# Patient Record
Sex: MALE | Race: WHITE | NOT HISPANIC OR LATINO
[De-identification: names, ages, dates, MRNs, and addresses within clinical notes are randomized per-mention and may not be internally consistent; named-entity substitution may affect disease eponyms.]

---

## 2017-05-19 PROBLEM — Z00.00 ENCOUNTER FOR PREVENTIVE HEALTH EXAMINATION: Status: ACTIVE | Noted: 2017-05-19

## 2017-05-22 ENCOUNTER — APPOINTMENT (OUTPATIENT)
Dept: HEART AND VASCULAR | Facility: CLINIC | Age: 67
End: 2017-05-22

## 2017-05-22 ENCOUNTER — NON-APPOINTMENT (OUTPATIENT)
Age: 67
End: 2017-05-22

## 2017-05-22 VITALS — DIASTOLIC BLOOD PRESSURE: 78 MMHG | HEART RATE: 61 BPM | SYSTOLIC BLOOD PRESSURE: 120 MMHG | OXYGEN SATURATION: 98 %

## 2017-05-22 VITALS — WEIGHT: 190 LBS | HEIGHT: 72 IN | BODY MASS INDEX: 25.73 KG/M2

## 2017-05-22 DIAGNOSIS — R06.09 OTHER FORMS OF DYSPNEA: ICD-10-CM

## 2017-05-22 DIAGNOSIS — Z87.891 PERSONAL HISTORY OF NICOTINE DEPENDENCE: ICD-10-CM

## 2017-05-22 DIAGNOSIS — R07.89 OTHER CHEST PAIN: ICD-10-CM

## 2017-05-22 DIAGNOSIS — R94.31 ABNORMAL ELECTROCARDIOGRAM [ECG] [EKG]: ICD-10-CM

## 2017-05-23 ENCOUNTER — APPOINTMENT (OUTPATIENT)
Dept: HEART AND VASCULAR | Facility: CLINIC | Age: 67
End: 2017-05-23

## 2017-05-24 LAB
25(OH)D3 SERPL-MCNC: 26.4 NG/ML
ALBUMIN SERPL ELPH-MCNC: 4.4 G/DL
ALP BLD-CCNC: 62 U/L
ALT SERPL-CCNC: 28 U/L
ANION GAP SERPL CALC-SCNC: 16 MMOL/L
AST SERPL-CCNC: 32 U/L
BASOPHILS # BLD AUTO: 0.03 K/UL
BASOPHILS NFR BLD AUTO: 0.7 %
BILIRUB SERPL-MCNC: 0.6 MG/DL
BUN SERPL-MCNC: 15 MG/DL
CALCIUM SERPL-MCNC: 9.4 MG/DL
CHLORIDE SERPL-SCNC: 103 MMOL/L
CHOLEST SERPL-MCNC: 218 MG/DL
CHOLEST/HDLC SERPL: 4.2 RATIO
CO2 SERPL-SCNC: 23 MMOL/L
CREAT SERPL-MCNC: 1.02 MG/DL
EOSINOPHIL # BLD AUTO: 0.06 K/UL
EOSINOPHIL NFR BLD AUTO: 1.5 %
GLUCOSE SERPL-MCNC: 117 MG/DL
HBA1C MFR BLD HPLC: 6 %
HCT VFR BLD CALC: 47.6 %
HDLC SERPL-MCNC: 52 MG/DL
HGB BLD-MCNC: 15.2 G/DL
IMM GRANULOCYTES NFR BLD AUTO: 0 %
LDLC SERPL CALC-MCNC: 146 MG/DL
LYMPHOCYTES # BLD AUTO: 1.61 K/UL
LYMPHOCYTES NFR BLD AUTO: 39.6 %
MAN DIFF?: NORMAL
MCHC RBC-ENTMCNC: 28.6 PG
MCHC RBC-ENTMCNC: 31.9 GM/DL
MCV RBC AUTO: 89.5 FL
MONOCYTES # BLD AUTO: 0.31 K/UL
MONOCYTES NFR BLD AUTO: 7.6 %
NEUTROPHILS # BLD AUTO: 2.06 K/UL
NEUTROPHILS NFR BLD AUTO: 50.6 %
PLATELET # BLD AUTO: 194 K/UL
POTASSIUM SERPL-SCNC: 4.7 MMOL/L
PROT SERPL-MCNC: 6.7 G/DL
RBC # BLD: 5.32 M/UL
RBC # FLD: 13.8 %
SODIUM SERPL-SCNC: 142 MMOL/L
TRIGL SERPL-MCNC: 99 MG/DL
TSH SERPL-ACNC: 1.7 UIU/ML
VIT B12 SERPL-MCNC: 379 PG/ML
WBC # FLD AUTO: 4.07 K/UL

## 2018-06-02 ENCOUNTER — EMERGENCY (EMERGENCY)
Facility: HOSPITAL | Age: 68
LOS: 1 days | Discharge: HOME | End: 2018-06-02
Attending: EMERGENCY MEDICINE
Payer: SELF-PAY

## 2018-06-02 VITALS — WEIGHT: 191.8 LBS | HEIGHT: 72 IN

## 2018-06-02 VITALS
DIASTOLIC BLOOD PRESSURE: 78 MMHG | OXYGEN SATURATION: 99 % | HEART RATE: 68 BPM | RESPIRATION RATE: 18 BRPM | SYSTOLIC BLOOD PRESSURE: 132 MMHG

## 2018-06-02 PROCEDURE — 99284 EMERGENCY DEPT VISIT MOD MDM: CPT

## 2018-06-02 PROCEDURE — 93971 EXTREMITY STUDY: CPT

## 2018-06-02 PROCEDURE — 93971 EXTREMITY STUDY: CPT | Mod: 26,RT

## 2018-06-02 NOTE — ED STATDOCS - MEDICAL DECISION MAKING DETAILS
Patient presents for evaluation of RLE edema and calf pain to rule out DVT with doppler x 2 days. He has no risk factors for DVT, denies recent travel or surgeries or history of blood clots.

## 2018-06-02 NOTE — ED STATDOCS - OBJECTIVE STATEMENT
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 He was seen at the clinic on [address] who recommended he come over to [place] to the [facility] for doppler. He denies any previous h/o DVT, denies chest pain, SOB, HA or dizziness.  He takes no blood thinning medications and denies any trauma.

## 2018-06-02 NOTE — ED STATDOCS - NS ED ROS FT
Const: Denies fever, chills  HEENT: Denies blurry vision, sore throat  Neck: Denies neck pain/stiffness  Resp: Denies coughing, SOB  Cardiovascular: + right LE edema. Denies CP, palpitations  GI: Denies nausea, vomiting, abdominal pain, diarrhea, constipation, blood in stool  : Denies urinary frequency/urgency/dysuria, hematuria  MSK: + right calf pain. Denies back pain  Neuro: Denies HA, dizziness, numbness, weakness  Skin: Denies rashes.

## 2018-06-02 NOTE — ED STATDOCS - PHYSICAL EXAMINATION
Const: Awake, alert and oriented. In no acute distress. Well appearing.  HEENT: NC/AT. Moist mucous membranes.  Eyes: No scleral icterus. EOMI.  Neck:. Soft and supple. Full ROM without pain.  Cardiac: Regular rate and regular rhythm. +S1/S2. No murmurs. Peripheral pulses 2+ and symmetric. 2+ pitting LE edema on the right to the ankle. No edema of the left LE.  Resp: Speaking in full sentences. No evidence of respiratory distress. No wheezes, rales or rhonchi.  Abd: Soft, non-tender, non-distended. Normal bowel sounds in all 4 quadrants. No guarding or rebound.  Back: Spine midline and non-tender. No CVAT.  MSK: + TTP belly of calf muscle on the right.  Skin: No rashes, abrasions or lacerations.  Lymph: No cervical lymphadenopathy.  Neuro: Awake, alert & oriented x 3. Moves all extremities symmetrically.

## 2018-06-02 NOTE — ED ADULT NURSE NOTE - OBJECTIVE STATEMENT
received pt AOx4 in supertrack, c/o swelling to RLE x 1 week, denies CP, SOB. resp even unlabored, in no distress, MAEx4, neuro intact. will continue to monitor